# Patient Record
Sex: MALE | Race: BLACK OR AFRICAN AMERICAN | NOT HISPANIC OR LATINO
[De-identification: names, ages, dates, MRNs, and addresses within clinical notes are randomized per-mention and may not be internally consistent; named-entity substitution may affect disease eponyms.]

---

## 2019-03-29 ENCOUNTER — TRANSCRIPTION ENCOUNTER (OUTPATIENT)
Age: 54
End: 2019-03-29

## 2020-10-15 ENCOUNTER — TRANSCRIPTION ENCOUNTER (OUTPATIENT)
Age: 55
End: 2020-10-15

## 2021-10-22 PROBLEM — Z00.00 ENCOUNTER FOR PREVENTIVE HEALTH EXAMINATION: Status: ACTIVE | Noted: 2021-10-22

## 2021-10-26 ENCOUNTER — APPOINTMENT (OUTPATIENT)
Dept: SURGERY | Facility: CLINIC | Age: 56
End: 2021-10-26
Payer: COMMERCIAL

## 2021-10-26 VITALS — WEIGHT: 198 LBS | BODY MASS INDEX: 24.62 KG/M2 | HEIGHT: 75 IN

## 2021-10-26 DIAGNOSIS — Z78.9 OTHER SPECIFIED HEALTH STATUS: ICD-10-CM

## 2021-10-26 DIAGNOSIS — Z80.0 FAMILY HISTORY OF MALIGNANT NEOPLASM OF DIGESTIVE ORGANS: ICD-10-CM

## 2021-10-26 DIAGNOSIS — J45.909 UNSPECIFIED ASTHMA, UNCOMPLICATED: ICD-10-CM

## 2021-10-26 PROCEDURE — 99203 OFFICE O/P NEW LOW 30 MIN: CPT

## 2021-10-26 RX ORDER — ALBUTEROL SULFATE 2.5 MG/3ML
VIAL, NEBULIZER (ML) INHALATION
Refills: 0 | Status: ACTIVE | COMMUNITY

## 2021-10-26 RX ORDER — MONTELUKAST SODIUM 10 MG/1
10 TABLET, FILM COATED ORAL
Refills: 0 | Status: ACTIVE | COMMUNITY

## 2021-10-26 NOTE — ASSESSMENT
[FreeTextEntry1] : Family physician: Dr. Chang\par Date: 10/26/2021 our referral bilateral inguinal hernias\par \par \par This is a 56-year-old gentleman who approxi a month ago noted a lump in his right groin.  Patient states the lump causes him discomfort when he does certain physical activity such as bending over or strenuous lifting type activities.  He has had no bouts of nausea or vomiting no change in bowel habits.  He also complains of having discomfort in the left groin as well.  Discomfort in left groin is not as bad as that in the right.  Was evaluated by his primary care physician approximate month ago noted to have bilateral inguinal hernias.\par \par Patient has no complaints of nausea or vomiting or change in bowel habits.\par \par Physical examination: Patient examined erect and supine position.  He has obvious bilateral groin hernias the right is much larger than that of the left.  The right is visible in the erect position.  The right is reducible in the supine position.  The left is much smaller.  Scrotum and testicles are within normal limits bilaterally.\par \par Impression/plan bilateral inguinal hernias, right and left groin pain: This is a 56-year-old gentleman with discomfort in both groins episodically from his inguinal hernias.  At this time I would recommend repairing the hernias.  We have discussed the various surgical approaches.  We have also discussed the pros and cons of mesh.\par \par Patient will be scheduled for bilateral laparoscopic inguinal hernia repair with mesh.  The indications alternatives and complications of procedure discussed questions answered we will plan obtain written consent the day of surgery.

## 2021-11-12 ENCOUNTER — RESULT REVIEW (OUTPATIENT)
Age: 56
End: 2021-11-12

## 2021-11-15 ENCOUNTER — APPOINTMENT (OUTPATIENT)
Dept: SURGERY | Facility: HOSPITAL | Age: 56
End: 2021-11-15

## 2021-12-13 DIAGNOSIS — K40.20 BILATERAL INGUINAL HERNIA, W/OUT OBSTRUCTION OR GANGRENE, NOT SPECIFIED AS RECURRENT: ICD-10-CM

## 2021-12-14 ENCOUNTER — APPOINTMENT (OUTPATIENT)
Dept: SURGERY | Facility: CLINIC | Age: 56
End: 2021-12-14
Payer: COMMERCIAL

## 2021-12-14 DIAGNOSIS — K40.90 UNILATERAL INGUINAL HERNIA, W/OUT OBSTRUCTION OR GANGRENE, NOT SPECIFIED AS RECURRENT: ICD-10-CM

## 2021-12-14 DIAGNOSIS — K40.30 UNILATERAL INGUINAL HERNIA, WITH OBSTRUCTION, W/OUT GANGRENE, NOT SPECIFIED AS RECURRENT: ICD-10-CM

## 2021-12-14 PROCEDURE — 99024 POSTOP FOLLOW-UP VISIT: CPT

## 2021-12-14 NOTE — ASSESSMENT
[FreeTextEntry1] : Patient presents today for follow-up after bilateral lap inguinal hernia repair.  Patient states he feels much better now than initially after surgery.  His discomfort is completely resolved.  He has no complaints.  He has no fever chills or sweats. \par  Physical examination incisions well-healed there is no evidence of infection hematoma seroma or ecchymosis.  He has no evidence recurrence bilaterally.  His scrotum and testicles are within normal limits testicles are nontender.\par \par Impression/plan status post bilateral lap inguinal herniorrhaphy patient doing well surgically no acute findings patient to return here on a as needed basis

## 2022-03-07 ENCOUNTER — TRANSCRIPTION ENCOUNTER (OUTPATIENT)
Age: 57
End: 2022-03-07

## 2022-09-26 ENCOUNTER — NON-APPOINTMENT (OUTPATIENT)
Age: 57
End: 2022-09-26

## 2023-03-22 ENCOUNTER — NON-APPOINTMENT (OUTPATIENT)
Age: 58
End: 2023-03-22

## 2023-03-22 ENCOUNTER — APPOINTMENT (OUTPATIENT)
Dept: GASTROENTEROLOGY | Facility: CLINIC | Age: 58
End: 2023-03-22
Payer: COMMERCIAL

## 2023-03-22 VITALS
BODY MASS INDEX: 24.62 KG/M2 | WEIGHT: 198 LBS | HEIGHT: 75 IN | OXYGEN SATURATION: 98 % | DIASTOLIC BLOOD PRESSURE: 87 MMHG | HEART RATE: 63 BPM | SYSTOLIC BLOOD PRESSURE: 136 MMHG

## 2023-03-22 DIAGNOSIS — Z12.11 ENCOUNTER FOR SCREENING FOR MALIGNANT NEOPLASM OF COLON: ICD-10-CM

## 2023-03-22 PROCEDURE — 99202 OFFICE O/P NEW SF 15 MIN: CPT

## 2023-03-22 NOTE — ASSESSMENT
[FreeTextEntry1] : Colon cancer screening - colonoscopy due - Colonoscopy scheduled - Risks, benefits, alternatives were discussed, including but not limited to bleeding, infection, perforation and sedation risks. Additionally, the possibility of missed lesions was conveyed.\par \par FHX colon cancer - q5y.\par \par PMD/consultation/hospital notes and Labs/imaging/prior endoscopic results reviewed to extent noted in HPI; and, if procedure code billed on this visit for lab draw, this serves to signify that labs were drawn here in this office.\par

## 2023-03-22 NOTE — HISTORY OF PRESENT ILLNESS
[FreeTextEntry1] : 57m asthma, hypothyroid, presenting for colon cancer screening. Pt denies abdominal pain, rectal bleeding, change in bowel habits, or unexplained weight loss.  \par \par Last colonoscopy:\par 10y ago in [place] reportedly normal\par \par Soc:  no tobacco or significant EtOH\par FHx: F - crc 40s\par \par ROS:\par Constitutional:: no weight loss, fevers\par ENT: no deafness\par Eyes: not blind\par Neck: no LN\par Chest: no dyspnea/cough\par Cardiac: no chest pain\par Vascular: no leg swelling\par GI: no abdominal pain, nausea, vomiting, diarrhea, constipation, rectal bleeding, dysphagia, melena unless otherwise noted in HPI\par : no dysuria, dark urine\par Skin: no rashes, jaundice\par Heme: no bleeding\par Endocrine: no DM unless otherwise stated in HPI\par \par Px: (VS noted below)\par General: NAD\par Eyes: anicteric\par Oropharynx:  clear\par Neck: no LN\par Chest: normal respiratory effort\par CVS: regular\par Abd: soft, NT, ND, +BS, no HSM\par Ext: no atrophy\par Neuro: grossly nonfocal\par \par Labs/imaging/prior endoscopic results reviewed to the extent available and noted in HPI\par

## 2023-03-22 NOTE — REASON FOR VISIT
[Consultation] : a consultation visit [FreeTextEntry1] : Kindly asked by DEEDEE ORTEGA,BONNIE PAZ  to consult and evaluate patient for                    colon screening\par A copy of this note is being sent to physician requesting consultation.

## 2023-03-22 NOTE — CONSULT LETTER
[FreeTextEntry1] : Dear Dr. BONNIE MARK ,\par \par I had the pleasure of evaluating your patient,  YUNIEL HAMILTON.\par \par Please refer to my note below.\par \par Thank you very much for allowing me to participate in the care of this patient.  If you have any questions, please do not hesitate to contact me.\par \par Sincerely, \par \par Babar Goff MD\par

## 2023-04-17 ENCOUNTER — RESULT REVIEW (OUTPATIENT)
Age: 58
End: 2023-04-17

## 2023-04-18 ENCOUNTER — APPOINTMENT (OUTPATIENT)
Dept: GASTROENTEROLOGY | Facility: HOSPITAL | Age: 58
End: 2023-04-18

## 2025-04-23 ENCOUNTER — NON-APPOINTMENT (OUTPATIENT)
Age: 60
End: 2025-04-23

## 2025-04-25 ENCOUNTER — APPOINTMENT (OUTPATIENT)
Dept: FAMILY MEDICINE | Facility: CLINIC | Age: 60
End: 2025-04-25
Payer: COMMERCIAL

## 2025-04-25 VITALS
OXYGEN SATURATION: 97 % | HEIGHT: 75 IN | SYSTOLIC BLOOD PRESSURE: 132 MMHG | WEIGHT: 209 LBS | DIASTOLIC BLOOD PRESSURE: 100 MMHG | TEMPERATURE: 97.3 F | BODY MASS INDEX: 25.99 KG/M2 | HEART RATE: 74 BPM

## 2025-04-25 VITALS — SYSTOLIC BLOOD PRESSURE: 130 MMHG | DIASTOLIC BLOOD PRESSURE: 90 MMHG

## 2025-04-25 DIAGNOSIS — R03.0 ELEVATED BLOOD-PRESSURE READING, W/OUT DIAGNOSIS OF HYPERTENSION: ICD-10-CM

## 2025-04-25 DIAGNOSIS — Z00.00 ENCOUNTER FOR GENERAL ADULT MEDICAL EXAMINATION W/OUT ABNORMAL FINDINGS: ICD-10-CM

## 2025-04-25 DIAGNOSIS — Z80.0 FAMILY HISTORY OF MALIGNANT NEOPLASM OF DIGESTIVE ORGANS: ICD-10-CM

## 2025-04-25 DIAGNOSIS — Z13.220 ENCOUNTER FOR SCREENING FOR LIPOID DISORDERS: ICD-10-CM

## 2025-04-25 DIAGNOSIS — R73.09 OTHER ABNORMAL GLUCOSE: ICD-10-CM

## 2025-04-25 DIAGNOSIS — E03.9 HYPOTHYROIDISM, UNSPECIFIED: ICD-10-CM

## 2025-04-25 DIAGNOSIS — J45.909 UNSPECIFIED ASTHMA, UNCOMPLICATED: ICD-10-CM

## 2025-04-25 DIAGNOSIS — Z12.5 ENCOUNTER FOR SCREENING FOR MALIGNANT NEOPLASM OF PROSTATE: ICD-10-CM

## 2025-04-25 DIAGNOSIS — K21.9 GASTRO-ESOPHAGEAL REFLUX DISEASE W/OUT ESOPHAGITIS: ICD-10-CM

## 2025-04-25 PROCEDURE — G0537: CPT

## 2025-04-25 PROCEDURE — G0444 DEPRESSION SCREEN ANNUAL: CPT | Mod: 59

## 2025-04-25 PROCEDURE — 99386 PREV VISIT NEW AGE 40-64: CPT

## 2025-04-25 RX ORDER — ALBUTEROL SULFATE 90 UG/1
108 (90 BASE) INHALANT RESPIRATORY (INHALATION)
Qty: 1 | Refills: 11 | Status: ACTIVE | COMMUNITY
Start: 2025-04-25 | End: 1900-01-01

## 2025-04-25 RX ORDER — FLUTICASONE PROPIONATE 50 UG/1
50 SPRAY, METERED NASAL
Qty: 1 | Refills: 5 | Status: ACTIVE | COMMUNITY
Start: 1900-01-01 | End: 1900-01-01

## 2025-04-25 RX ORDER — LEVOTHYROXINE SODIUM 0.03 MG/1
25 TABLET ORAL
Qty: 90 | Refills: 3 | Status: ACTIVE | COMMUNITY
Start: 1900-01-01 | End: 1900-01-01

## 2025-04-28 LAB
ALBUMIN SERPL ELPH-MCNC: 4.6 G/DL
ALP BLD-CCNC: 83 U/L
ALT SERPL-CCNC: 18 U/L
ANION GAP SERPL CALC-SCNC: 14 MMOL/L
AST SERPL-CCNC: 22 U/L
BASOPHILS # BLD AUTO: 0.04 K/UL
BASOPHILS NFR BLD AUTO: 0.7 %
BILIRUB SERPL-MCNC: 1 MG/DL
BUN SERPL-MCNC: 9 MG/DL
CALCIUM SERPL-MCNC: 9.5 MG/DL
CHLORIDE SERPL-SCNC: 104 MMOL/L
CHOLEST SERPL-MCNC: 169 MG/DL
CO2 SERPL-SCNC: 24 MMOL/L
CREAT SERPL-MCNC: 1.1 MG/DL
EGFRCR SERPLBLD CKD-EPI 2021: 78 ML/MIN/1.73M2
EOSINOPHIL # BLD AUTO: 0.13 K/UL
EOSINOPHIL NFR BLD AUTO: 2.1 %
ESTIMATED AVERAGE GLUCOSE: 114 MG/DL
GLUCOSE SERPL-MCNC: 71 MG/DL
HBA1C MFR BLD HPLC: 5.6 %
HCT VFR BLD CALC: 43.4 %
HDLC SERPL-MCNC: 55 MG/DL
HGB BLD-MCNC: 14.7 G/DL
IMM GRANULOCYTES NFR BLD AUTO: 0.2 %
LDLC SERPL-MCNC: 95 MG/DL
LYMPHOCYTES # BLD AUTO: 1.36 K/UL
LYMPHOCYTES NFR BLD AUTO: 22.4 %
MAN DIFF?: NORMAL
MCHC RBC-ENTMCNC: 33.4 PG
MCHC RBC-ENTMCNC: 33.9 G/DL
MCV RBC AUTO: 98.6 FL
MONOCYTES # BLD AUTO: 0.47 K/UL
MONOCYTES NFR BLD AUTO: 7.8 %
NEUTROPHILS # BLD AUTO: 4.05 K/UL
NEUTROPHILS NFR BLD AUTO: 66.8 %
NONHDLC SERPL-MCNC: 114 MG/DL
PLATELET # BLD AUTO: 181 K/UL
POTASSIUM SERPL-SCNC: 3.9 MMOL/L
PROT SERPL-MCNC: 6.7 G/DL
PSA FREE FLD-MCNC: 34 %
PSA FREE SERPL-MCNC: 0.31 NG/ML
PSA SERPL-MCNC: 0.91 NG/ML
RBC # BLD: 4.4 M/UL
RBC # FLD: 12.8 %
SODIUM SERPL-SCNC: 142 MMOL/L
T4 FREE SERPL-MCNC: 1.1 NG/DL
TRIGL SERPL-MCNC: 105 MG/DL
TSH SERPL-ACNC: 5.31 UIU/ML
WBC # FLD AUTO: 6.06 K/UL

## 2025-06-10 ENCOUNTER — APPOINTMENT (OUTPATIENT)
Dept: INTERNAL MEDICINE | Facility: CLINIC | Age: 60
End: 2025-06-10
Payer: COMMERCIAL

## 2025-06-10 PROCEDURE — 36415 COLL VENOUS BLD VENIPUNCTURE: CPT

## 2025-06-11 LAB
T4 FREE SERPL-MCNC: 1.1 NG/DL
TSH SERPL-ACNC: 6.39 UIU/ML

## 2025-06-16 ENCOUNTER — APPOINTMENT (OUTPATIENT)
Dept: FAMILY MEDICINE | Facility: CLINIC | Age: 60
End: 2025-06-16
Payer: COMMERCIAL

## 2025-06-16 VITALS
SYSTOLIC BLOOD PRESSURE: 126 MMHG | OXYGEN SATURATION: 97 % | HEIGHT: 75 IN | DIASTOLIC BLOOD PRESSURE: 90 MMHG | BODY MASS INDEX: 26.11 KG/M2 | WEIGHT: 210 LBS | TEMPERATURE: 97.2 F | HEART RATE: 72 BPM

## 2025-06-16 PROCEDURE — G2211 COMPLEX E/M VISIT ADD ON: CPT | Mod: NC

## 2025-06-16 PROCEDURE — 99213 OFFICE O/P EST LOW 20 MIN: CPT

## 2025-06-18 LAB
T4 FREE SERPL-MCNC: 1.1 NG/DL
TSH SERPL-ACNC: 7.69 UIU/ML